# Patient Record
Sex: MALE | Race: BLACK OR AFRICAN AMERICAN
[De-identification: names, ages, dates, MRNs, and addresses within clinical notes are randomized per-mention and may not be internally consistent; named-entity substitution may affect disease eponyms.]

---

## 2018-03-10 ENCOUNTER — HOSPITAL ENCOUNTER (EMERGENCY)
Dept: HOSPITAL 92 - ERS | Age: 23
Discharge: HOME | End: 2018-03-10
Payer: MEDICAID

## 2018-03-10 DIAGNOSIS — R07.89: Primary | ICD-10-CM

## 2018-03-10 PROCEDURE — 93005 ELECTROCARDIOGRAM TRACING: CPT

## 2018-03-10 PROCEDURE — 71045 X-RAY EXAM CHEST 1 VIEW: CPT

## 2018-03-10 NOTE — RAD
PORTABLE CHEST:

 

Date:  03/10/18 

 

PROVIDED CLINICAL HISTORY:   

Chest pain. 

 

FINDINGS:

Cardiac silhouette appears prominent, likely at least partially on the basis of portable technique. L
ungs appear clear. There is no pleural fluid or pneumothorax apparent. 

 

IMPRESSION: 

No evidence for an acute cardiopulmonary process. 

 

 

POS: [facility]